# Patient Record
Sex: MALE | Race: BLACK OR AFRICAN AMERICAN
[De-identification: names, ages, dates, MRNs, and addresses within clinical notes are randomized per-mention and may not be internally consistent; named-entity substitution may affect disease eponyms.]

---

## 2018-04-05 ENCOUNTER — HOSPITAL ENCOUNTER (EMERGENCY)
Dept: HOSPITAL 62 - ER | Age: 23
Discharge: HOME | End: 2018-04-05
Payer: SELF-PAY

## 2018-04-05 VITALS — DIASTOLIC BLOOD PRESSURE: 66 MMHG | SYSTOLIC BLOOD PRESSURE: 115 MMHG

## 2018-04-05 DIAGNOSIS — R11.2: Primary | ICD-10-CM

## 2018-04-05 DIAGNOSIS — R10.9: ICD-10-CM

## 2018-04-05 DIAGNOSIS — R42: ICD-10-CM

## 2018-04-05 PROCEDURE — 99283 EMERGENCY DEPT VISIT LOW MDM: CPT

## 2018-04-05 NOTE — ER DOCUMENT REPORT
ED General





- General


Chief Complaint: Dizziness


Stated Complaint: STOMACH PAIN


Time Seen by Provider: 04/05/18 12:08


Notes: 





22-year-old male here with 1 day of lightheadedness nausea vomiting and 

abdominal cramping.  He has not tried taking anything for the symptoms.  He 

denies diarrhea fevers chills.  No known sick contacts.  He is here today 

because 30 minutes ago, he had an episode of vomiting.


TRAVEL OUTSIDE OF THE [place] IN LAST 30 DAYS: No





- Related Data


Allergies/Adverse Reactions: 


 





No Known Allergies Allergy (Verified 04/05/18 12:08)


 











Past Medical History





- Social History


Smoking Status: Unknown if Ever Smoked


Family History: None





- Immunizations


Immunizations up to date: Yes





Review of Systems





- Review of Systems


Notes: 





See history of present illness for pertinent positive review of systems; 

otherwise all review of systems have been reviewed and are negative





Physical Exam





- Vital signs


Vitals: 





 











Temp Pulse Resp BP Pulse Ox


 


 98.4 F   52 L  16   115/66   100 


 


 04/05/18 12:04  04/05/18 12:04  04/05/18 12:04  04/05/18 12:04  04/05/18 12:04














- Notes


Notes: 





PHYSICAL EXAMINATION:





GENERAL: Well-appearing and in no acute distress.





HEAD: Atraumatic, normocephalic.





EYES: Pupils equal round and reactive to light, extraocular movements intact, 

sclera anicteric, conjunctiva are normal.





ENT: nares patent, oropharynx clear without exudates.  Moist mucous membranes.





NECK: Normal range of motion, supple without lymphadenopathy





LUNGS: CTAB and equal.  No wheezes rales or rhonchi.





HEART: Regular rate and rhythm without murmurs





ABDOMEN: Soft, no tenderness.  No guarding, no rebound.  No facial grimacing/

wincing





EXTREMITIES: Normal range of motion, no pitting edema.  No cyanosis.





NEUROLOGICAL: Cranial nerves grossly intact. Normal sensory/motor exams.





PSYCH: Normal mood, normal affect.





SKIN: Warm, Dry, normal turgor, no rashes or lesions noted





Course





- Re-evaluation


Re-evalutation: 





04/05/18 12:17


MEDICAL DECISION MAKING:


Concern for viral gastroenteritis


He has no abdominal tenderness on exam


Will give a dose of Phenergan here and prescription Zofran Phenergan


Instructed follow-up PCP next day or few


Patient understands and agrees to the plan of care





- Vital Signs


Vital signs: 





 











Temp Pulse Resp BP Pulse Ox


 


 98.4 F   52 L  16   115/66   100 


 


 04/05/18 12:04  04/05/18 12:04  04/05/18 12:04  04/05/18 12:04  04/05/18 12:04














Discharge





- Discharge


Clinical Impression: 


Nausea & vomiting


Qualifiers:


 Vomiting type: unspecified Vomiting Intractability: non-intractable Qualified 

Code(s): R11.2 - Nausea with vomiting, unspecified





Condition: Good


Disposition: HOME, SELF-CARE


Additional Instructions: 


Use prescribed medication as needed for vomiting.  Use the Zofran first, if 

after 2 hours, it is not helping, try the Phenergan second.  Stay hydrated with 

Gatorade.  You were seen in the emergency department at [facility]. If you were given any sedating medications, be sure not to operate 

heavy machinery (example - driving) and be sure you are not too sedated to walk 

appropriately.  Please followup with your primary physician in the next few 

days for further management/evaluation.  Please return to the emergency 

department for worsening of symptoms or any symptom that you deem to be 

concerning or life-threatening.  Thank you for allowing us to be part of your 

care.


Prescriptions: 


Ondansetron [Zofran Odt 4 mg Tablet] 1 tab PO Q4H PRN #15 tab.rapdis


 PRN Reason: For Nausea/Vomiting


Promethazine HCl [Phenergan 25 mg Tablet] 1 tab PO Q6H PRN #15 tablet


 PRN Reason: